# Patient Record
Sex: MALE | Race: OTHER | HISPANIC OR LATINO | URBAN - METROPOLITAN AREA
[De-identification: names, ages, dates, MRNs, and addresses within clinical notes are randomized per-mention and may not be internally consistent; named-entity substitution may affect disease eponyms.]

---

## 2024-08-19 ENCOUNTER — EMERGENCY (EMERGENCY)
Facility: HOSPITAL | Age: 39
LOS: 1 days | Discharge: HOME | End: 2024-08-19
Attending: EMERGENCY MEDICINE | Admitting: EMERGENCY MEDICINE
Payer: SELF-PAY

## 2024-08-19 VITALS
DIASTOLIC BLOOD PRESSURE: 98 MMHG | HEART RATE: 66 BPM | SYSTOLIC BLOOD PRESSURE: 173 MMHG | RESPIRATION RATE: 18 BRPM | WEIGHT: 154.98 LBS | OXYGEN SATURATION: 97 % | TEMPERATURE: 98 F

## 2024-08-19 VITALS
RESPIRATION RATE: 16 BRPM | TEMPERATURE: 98 F | HEART RATE: 72 BPM | OXYGEN SATURATION: 99 % | DIASTOLIC BLOOD PRESSURE: 90 MMHG | SYSTOLIC BLOOD PRESSURE: 150 MMHG

## 2024-08-19 LAB
ALBUMIN SERPL ELPH-MCNC: 4.4 G/DL — SIGNIFICANT CHANGE UP (ref 3.3–5)
ALP SERPL-CCNC: 70 U/L — SIGNIFICANT CHANGE UP (ref 40–120)
ALT FLD-CCNC: 19 U/L — SIGNIFICANT CHANGE UP (ref 4–41)
ANION GAP SERPL CALC-SCNC: 14 MMOL/L — SIGNIFICANT CHANGE UP (ref 7–14)
AST SERPL-CCNC: 21 U/L — SIGNIFICANT CHANGE UP (ref 4–40)
BASOPHILS # BLD AUTO: 0.07 K/UL — SIGNIFICANT CHANGE UP (ref 0–0.2)
BASOPHILS NFR BLD AUTO: 0.8 % — SIGNIFICANT CHANGE UP (ref 0–2)
BILIRUB SERPL-MCNC: 0.4 MG/DL — SIGNIFICANT CHANGE UP (ref 0.2–1.2)
BUN SERPL-MCNC: 12 MG/DL — SIGNIFICANT CHANGE UP (ref 7–23)
CALCIUM SERPL-MCNC: 9.2 MG/DL — SIGNIFICANT CHANGE UP (ref 8.4–10.5)
CHLORIDE SERPL-SCNC: 102 MMOL/L — SIGNIFICANT CHANGE UP (ref 98–107)
CO2 SERPL-SCNC: 23 MMOL/L — SIGNIFICANT CHANGE UP (ref 22–31)
CREAT SERPL-MCNC: 0.81 MG/DL — SIGNIFICANT CHANGE UP (ref 0.5–1.3)
EGFR: 116 ML/MIN/1.73M2 — SIGNIFICANT CHANGE UP
EOSINOPHIL # BLD AUTO: 0.17 K/UL — SIGNIFICANT CHANGE UP (ref 0–0.5)
EOSINOPHIL NFR BLD AUTO: 1.9 % — SIGNIFICANT CHANGE UP (ref 0–6)
GLUCOSE SERPL-MCNC: 114 MG/DL — HIGH (ref 70–99)
HCT VFR BLD CALC: 44.7 % — SIGNIFICANT CHANGE UP (ref 39–50)
HGB BLD-MCNC: 15.5 G/DL — SIGNIFICANT CHANGE UP (ref 13–17)
IANC: 4.55 K/UL — SIGNIFICANT CHANGE UP (ref 1.8–7.4)
IMM GRANULOCYTES NFR BLD AUTO: 0.3 % — SIGNIFICANT CHANGE UP (ref 0–0.9)
LIDOCAIN IGE QN: 45 U/L — SIGNIFICANT CHANGE UP (ref 7–60)
LYMPHOCYTES # BLD AUTO: 3.45 K/UL — HIGH (ref 1–3.3)
LYMPHOCYTES # BLD AUTO: 39 % — SIGNIFICANT CHANGE UP (ref 13–44)
MAGNESIUM SERPL-MCNC: 2 MG/DL — SIGNIFICANT CHANGE UP (ref 1.6–2.6)
MCHC RBC-ENTMCNC: 29.8 PG — SIGNIFICANT CHANGE UP (ref 27–34)
MCHC RBC-ENTMCNC: 34.7 GM/DL — SIGNIFICANT CHANGE UP (ref 32–36)
MCV RBC AUTO: 85.8 FL — SIGNIFICANT CHANGE UP (ref 80–100)
MONOCYTES # BLD AUTO: 0.58 K/UL — SIGNIFICANT CHANGE UP (ref 0–0.9)
MONOCYTES NFR BLD AUTO: 6.6 % — SIGNIFICANT CHANGE UP (ref 2–14)
NEUTROPHILS # BLD AUTO: 4.55 K/UL — SIGNIFICANT CHANGE UP (ref 1.8–7.4)
NEUTROPHILS NFR BLD AUTO: 51.4 % — SIGNIFICANT CHANGE UP (ref 43–77)
NRBC # BLD: 0 /100 WBCS — SIGNIFICANT CHANGE UP (ref 0–0)
NRBC # FLD: 0 K/UL — SIGNIFICANT CHANGE UP (ref 0–0)
PLATELET # BLD AUTO: 199 K/UL — SIGNIFICANT CHANGE UP (ref 150–400)
POTASSIUM SERPL-MCNC: 4.1 MMOL/L — SIGNIFICANT CHANGE UP (ref 3.5–5.3)
POTASSIUM SERPL-SCNC: 4.1 MMOL/L — SIGNIFICANT CHANGE UP (ref 3.5–5.3)
PROT SERPL-MCNC: 8.4 G/DL — HIGH (ref 6–8.3)
RBC # BLD: 5.21 M/UL — SIGNIFICANT CHANGE UP (ref 4.2–5.8)
RBC # FLD: 13.3 % — SIGNIFICANT CHANGE UP (ref 10.3–14.5)
SODIUM SERPL-SCNC: 139 MMOL/L — SIGNIFICANT CHANGE UP (ref 135–145)
TROPONIN T, HIGH SENSITIVITY RESULT: <6 NG/L — SIGNIFICANT CHANGE UP
WBC # BLD: 8.85 K/UL — SIGNIFICANT CHANGE UP (ref 3.8–10.5)
WBC # FLD AUTO: 8.85 K/UL — SIGNIFICANT CHANGE UP (ref 3.8–10.5)

## 2024-08-19 PROCEDURE — 99285 EMERGENCY DEPT VISIT HI MDM: CPT

## 2024-08-19 PROCEDURE — 99053 MED SERV 10PM-8AM 24 HR FAC: CPT

## 2024-08-19 PROCEDURE — 71046 X-RAY EXAM CHEST 2 VIEWS: CPT | Mod: 26

## 2024-08-19 PROCEDURE — 93010 ELECTROCARDIOGRAM REPORT: CPT

## 2024-08-19 RX ORDER — IBUPROFEN 200 MG
1 TABLET ORAL
Qty: 42 | Refills: 0
Start: 2024-08-19 | End: 2024-09-01

## 2024-08-19 RX ORDER — METOCLOPRAMIDE HCL 5 MG/ML
10 VIAL (ML) INJECTION ONCE
Refills: 0 | Status: COMPLETED | OUTPATIENT
Start: 2024-08-19 | End: 2024-08-19

## 2024-08-19 RX ORDER — ONDANSETRON HCL/PF 4 MG/2 ML
1 VIAL (ML) INJECTION
Qty: 2 | Refills: 0
Start: 2024-08-19

## 2024-08-19 RX ORDER — FAMOTIDINE 40 MG/1
20 TABLET, FILM COATED ORAL ONCE
Refills: 0 | Status: COMPLETED | OUTPATIENT
Start: 2024-08-19 | End: 2024-08-19

## 2024-08-19 RX ORDER — DEXTROSE MONOHYDRATE, SODIUM CHLORIDE, SODIUM LACTATE, CALCIUM CHLORIDE, MAGNESIUM CHLORIDE 1.5; 538; 448; 18.4; 5.08 G/100ML; MG/100ML; MG/100ML; MG/100ML; MG/100ML
1000 SOLUTION INTRAPERITONEAL ONCE
Refills: 0 | Status: COMPLETED | OUTPATIENT
Start: 2024-08-19 | End: 2024-08-19

## 2024-08-19 RX ORDER — ACETAMINOPHEN 500 MG
1000 TABLET ORAL ONCE
Refills: 0 | Status: COMPLETED | OUTPATIENT
Start: 2024-08-19 | End: 2024-08-19

## 2024-08-19 RX ORDER — MAGNESIUM, ALUMINUM HYDROXIDE 200-225/5
30 SUSPENSION, ORAL (FINAL DOSE FORM) ORAL ONCE
Refills: 0 | Status: COMPLETED | OUTPATIENT
Start: 2024-08-19 | End: 2024-08-19

## 2024-08-19 RX ADMIN — Medication 400 MILLIGRAM(S): at 02:09

## 2024-08-19 RX ADMIN — DEXTROSE MONOHYDRATE, SODIUM CHLORIDE, SODIUM LACTATE, CALCIUM CHLORIDE, MAGNESIUM CHLORIDE 1000 MILLILITER(S): 1.5; 538; 448; 18.4; 5.08 SOLUTION INTRAPERITONEAL at 01:49

## 2024-08-19 RX ADMIN — FAMOTIDINE 20 MILLIGRAM(S): 40 TABLET, FILM COATED ORAL at 01:50

## 2024-08-19 RX ADMIN — Medication 30 MILLILITER(S): at 01:49

## 2024-08-19 RX ADMIN — Medication 10 MILLIGRAM(S): at 01:50

## 2024-08-19 NOTE — ED PROVIDER NOTE - OBJECTIVE STATEMENT
39-year-old male with reportedly no past medical history presenting with headache, nausea, chest discomfort.  Patient states that around 11 AM yesterday he began having a mild headache which progressively worsened throughout the day and is now associated with nausea.  This evening he developed pain in the middle of his chest.  Describes his headache as bitemporal throbbing.  Chest pain described as central and nonradiating.  Nonexertional.  Patient states that he has gotten similar headaches in the past but that this 1 is worse and has associated nausea which is new for him.  Denies numbness weakness shortness of breath fever cough vision changes vomiting abdominal pain dysuria.

## 2024-08-19 NOTE — ED ADULT NURSE REASSESSMENT NOTE - NS ED NURSE REASSESS COMMENT FT1
BREAK RN: Report received from primary RN Edilma. Pt with no acute changes at this time. Pt A&ox4, ambulatory, on room air. VSS. NSR on cardiac monitor. Pt respirations even and unlabored, chest rise and fall equal b/l. Pt denies chest pain, HA, SOB, dizziness, N/V/D, fever/chills. Pt pending dispo. Stretcher in lowest position, pt safety maintained.

## 2024-08-19 NOTE — ED PROVIDER NOTE - PHYSICAL EXAMINATION
PHYSICAL EXAM:  CONSTITUTIONAL: No acute distress  HEAD: Atraumatic  EYES: Clear bilaterally, pupils equal, round and reactive to light.  ENMT: Airway patent, Nasal mucosa clear. Mouth with normal mucosa. Uvula is midline.   CARDIAC: Normal rate, regular rhythm. +S1/S2. No murmurs, rubs or gallops.  RESPIRATORY: Breathing unlabored. Breath sounds clear and equal bilaterally.  ABDOMEN: Soft, nontender, nondistended. No rebound tenderness or guarding.  NEUROLOGICAL: Alert and oriented, no focal deficits, no motor or sensory deficits. CN2-12 intact. Sensation intact x4 extremities.  SKIN: Skin warm and dry. No evidence of rashes or lesions.

## 2024-08-19 NOTE — ED PROVIDER NOTE - NSFOLLOWUPINSTRUCTIONS_ED_ALL_ED_FT
Your may follow up with the   [facility]  [address], [address]   [place] [address]   [phone]    Omar Richter MD   Is the Director, [facility]       Migraña    LO QUE NECESITA SABER:    Mt migraña es un dolor de la intenso. El dolor puede ser tan severo que interfiere con carly actividades cotidianas. Mt migraña puede durar desde pocas horas hasta varios días. La causa exacta de la migraña no es conocida.    INSTRUCCIONES SOBRE EL PIERRE HOSPITALARIA:  Regrese a la zackery de emergencias si:  Usted tiene dolor de la que parece ser diferente o mucho peor que savage migraña habitual.  Usted tiene un dolor de la severo con fiebre o rigidez en el marley.  Usted tiene nuevos problemas con el habla, la visión, el equilibrio o el movimiento.  Usted siente que se va a desmayar, se siente confundido o sufre mt convulsión.  Comuníquese con savage médico o neurólogo si:  Savage migraña interfiere con carly actividades cotidianas.  [place] medicamentos o tratamientos jennifer de funcionar.  Usted tiene preguntas o inquietudes acerca de savage condición o cuidado.  Medicamentos:Usted podría necesitar alguno de los siguientes. [place] medicamento tan pronto destiny sienta que le comienza mt migraña.  Puede administrarsepodrían administrarse. No espere a que el dolor sea muy intenso para meera el medicamento.  Medicamentos para la migrañase usa para evitar mt migraña o detenerla mt vez que comience.  Los medicamentos contra las náuseaspueden darse para calmar savage estómago y ayudarle a prevenir los vómitos. [place] medicamento también puede aliviar el dolor.  [place] carly medicamentos destiny se le haya indicado.Consulte con savage médico si usted margarito que savage medicamento no le está ayudando o si presenta efectos secundarios. Infórmele si es alérgico a cualquier medicamento. Mantenga mt lista actualizada de los medicamentos, las vitaminas y los productos herbales que mile. Incluya los siguientes datos de los medicamentos: cantidad, frecuencia y motivo de administración. Traiga con usted la lista o los envases de las píldoras a carly citas de seguimiento. Lleve la lista de los medicamentos con usted en doug de mt emergencia.  El manejo de carly síntomas:  Repose en mt habitación oscura y tranquila.[place] ayudará a disminuir el dolor. El dormir también podría ayudarlo a aliviar savage dolor.  Aplique hielo para reducir el dolor.Use mt compresa de hielo o ponga hielo triturado en mt bolsa de plástico. Cubra el paquete de hielo con mt toalla y colóqueselo en la la. Aplique hielo rosina 15 a 20 minutos cada hora.  Aplique calor para disminuir el dolor y los espasmos musculares.Utilice mt toalla pequeña empapada con [place], mt almohada térmica o tome un baño de mary con agua tibia. Aplique la compresa caliente sobre el área por 20 a 30 minutos cada 2 horas. Usted puede alternar el calor y el hielo.  Mantenga un registro de las migrañas.Escriba cuándo comienzan y terminan carly migrañas. Incluya carly síntomas y qué estaba haciendo cuando comenzó mt migraña. Registre lo que comió y lo que tomó las 24 horas antes de que comenzó savage migraña. Mantenga un registro de lo que hizo para tratar savage migraña y si funcionó. Traiga el registro de las migrañas con usted a las citas con savage médico.  Programe mt wally con savage médico o savage neurólogo destiny se le indique:Lleve savage registro de migrañas con usted. Anote carly preguntas para que se acuerde de hacerlas rosina carly visitas.  Evite otra migraña:  No fume.La nicotina y otras sustancias químicas en los cigarrillos y puros pueden desencadenar mt migraña o agravarla. Pida información a savage médico si usted actualmente fuma y necesita ayuda para dejar de fumar. Los cigarrillos electrónicos o el tabaco sin humo igualmente contienen nicotina. Consulte con savage médico antes de utilizar estos productos.  No consuma alcohol.El alcohol puede provocar migraña. También puede impedir que tengan efecto los medicamentos para la migraña.  Ejercítese regularmente.El ejercicio puede ayudar a evitar migrañas. Consulte con savage médico acerca de cuál es el mejor régimen de ejercicio para usted. Trate de hacer unos 30 minutos de ejercicio todos los días que pueda.  Controle el estrés.El estrés podría provocar migraña. Aprenda nuevas maneras de relajarse destiny la respiración profunda.  Establezca un horario para dormir.Acuéstese y levántese a la misma hora cada día. No siri televisión inmediatamente antes de acostarse.  Coma carly comidas regularmente.Incluya alimentos saludables destiny la fruta, verduras, panes de grano entero, productos lácteos bajos en grasa, frijoles, carne magra y pescado. No consuma alimentos o bebidas que puedan desencadenar carly migrañas.        Migraine Headache  WHAT YOU NEED TO KNOW:  A migraine is a severe headache. The pain can be so severe that it interferes with your daily activities. A migraine can last a few hours up to several days. The exact cause of migraines is not known.    DISCHARGE INSTRUCTIONS:  Return to the emergency department if:  You have a headache that seems different or much worse than your usual migraine headache.  You have a severe headache with a fever or a stiff neck.  You have new problems with speech, vision, balance, or movement.  You feel like you are going to faint, you become confused, or you have a seizure.  Contact your healthcare provider or neurologist if:  Your migraines interfere with your daily activities.  Your medicines or treatments stop working.  You have questions or concerns about your condition or care.  Medicines: You may need any of the following. Take medicine as soon as you feel a migraine begin.  Prescription pain medicine may be given. Do not wait until the pain is severe before you take your medicine.  Migraine medicines are used to help prevent a migraine or stop it once it starts.  Antinausea medicine may be given to calm your stomach and to help prevent vomiting. This medicine can also help relieve pain.  Take your medicine as directed. Contact your healthcare provider if you think your medicine is not helping or if you have side effects. Tell him or her if you are allergic to any medicine. Keep a list of the medicines, vitamins, and herbs you take. Include the amounts, and when and why you take them. Bring the list or the pill bottles to follow-up visits. Carry your medicine list with you in case of an emergency.  Manage your symptoms:  Rest in a dark, quiet room. This will help decrease your pain. Sleep may also help relieve the pain.  Apply ice to decrease pain. Use an ice pack, or put crushed ice in a plastic bag. Cover the ice pack with a towel and place it on your head. Apply ice for 15 to 20 minutes every  hour.  Apply heat to decrease pain and muscle spasms. Use a small towel dampened with warm water or a heating pad, or sit in a warm bath. Apply heat on the area for 20 to 30 minutes every 2 hours. You may alternate heat and ice.  Keep a migraine record. Write down when your migraines start and stop. Include your symptoms and what you were doing when a migraine began. Record what you ate or drank for 24 hours before the migraine started. Keep track of what you did to treat your migraine and if it worked. Bring the migraine record with you to visits with your healthcare provider.  Follow up with your healthcare provider or neurologist as directed: Bring your migraine record with you. Write down your questions so you remember to ask them during your visits.  Prevent another migraine:  Do not smoke. Nicotine and other chemicals in cigarettes and cigars can trigger a migraine or make it worse. Ask your healthcare provider for information if you currently smoke and need help to quit. E-cigarettes or smokeless tobacco still contain nicotine. Talk to your healthcare provider before you use these products.  Do not drink alcohol. Alcohol can trigger a migraine. It can also keep medicines used to treat your migraines from working.  Get regular exercise. Exercise may help prevent migraines. Talk to your healthcare provider about the best exercise plan for you. Try to get at least 30 minutes of exercise on most days.  Manage stress. Stress may trigger a migraine. Learn new ways to relax, such as deep breathing.  Create a sleep schedule. Go to bed and get up at the same times each day. Do not watch television before bed.  Eat regular meals. Include healthy foods such as include fruit, vegetables, whole-grain breads, low-fat dairy products, beans, lean meat, and fish. Do not have food or drinks that trigger your migraines.

## 2024-08-19 NOTE — ED ADULT NURSE NOTE - OBJECTIVE STATEMENT
Patient is Bulgarian speaking. c/o chest pain, abdominal pain, and H/A. MD evaluation in progress. NAD noted. Will continue to monitor.

## 2024-08-19 NOTE — ED PROVIDER NOTE - ATTENDING CONTRIBUTION TO CARE
39-year-old male with reportedly no past medical history presenting with headache, nausea, chest discomfort.  Patient states that around 11 AM yesterday he began having a mild headache which progressively worsened throughout the day and is now associated with nausea.  This evening he developed pain in the middle of his chest.  Describes his headache as bitemporal throbbing.  Chest pain described as central and nonradiating.  Nonexertional.  Patient states that he has gotten similar headaches in the past but that this 1 is worse and has associated nausea which is new for him.  Denies numbness weakness shortness of breath fever cough vision changes vomiting abdominal pain dysuria.    heart RRR, lungs ctab, abd soft nontender, no rebound/guarding, no CVAT. Gait normal. no gross MSK deformities. CN 2-12 intact, no focal deficits.     39-year-old male with reportedly no past medical history presenting with headache, nausea, chest discomfort. Pt likely anxiety and headache and GERD. No focal deficits on exam. Will obtain labs, imaging, provide pain meds, GI cocktail and reassess.

## 2024-08-19 NOTE — ED PROVIDER NOTE - CLINICAL SUMMARY MEDICAL DECISION MAKING FREE TEXT BOX
39-year-old male with reportedly no past medical history presenting with headache, nausea, chest discomfort. VS with htn, otherwise not actionable. Exam with normal cardiopulm, abd, and neuro exam . Given progressive worsening nature of ha and no red flag sx/abn neuro findings, likely low risk for acute intracerebral pathology. EKG wo abnormal findings, Low heart score. Will obtain labs, cxr, give migraine cocktail and reassess, likely tbdc with neuro and/or cards fu

## 2024-09-03 NOTE — ED PROVIDER NOTE - WR INTERPRETATION 1
Education Record    Learner:  Patient/ spouse     Disease / Diagnosis: iron def. Anemia     Barriers / Limitations:  None   Comments:    Method:  Discussion   Comments:    General Topics:  Plan of care reviewed   Comments:    Outcome:  Shows understanding   Comments:   Has been taking oral iron for a long time.   Last colonoscopy was in 2023.   Feeling well.       
CXR negative - No pneumothorax, No opacities, No free air